# Patient Record
Sex: MALE | Race: WHITE | NOT HISPANIC OR LATINO | Employment: FULL TIME | URBAN - METROPOLITAN AREA
[De-identification: names, ages, dates, MRNs, and addresses within clinical notes are randomized per-mention and may not be internally consistent; named-entity substitution may affect disease eponyms.]

---

## 2022-03-10 ENCOUNTER — OFFICE VISIT (OUTPATIENT)
Dept: FAMILY MEDICINE | Facility: OTHER | Age: 52
End: 2022-03-10
Payer: COMMERCIAL

## 2022-03-10 VITALS
RESPIRATION RATE: 18 BRPM | TEMPERATURE: 97.8 F | OXYGEN SATURATION: 98 % | BODY MASS INDEX: 32.26 KG/M2 | SYSTOLIC BLOOD PRESSURE: 120 MMHG | HEIGHT: 71 IN | DIASTOLIC BLOOD PRESSURE: 80 MMHG | WEIGHT: 230.4 LBS | HEART RATE: 73 BPM

## 2022-03-10 DIAGNOSIS — J34.89 RHINORRHEA: Primary | ICD-10-CM

## 2022-03-10 DIAGNOSIS — H93.8X3 PRESSURE SENSATION IN BOTH EARS: ICD-10-CM

## 2022-03-10 DIAGNOSIS — R09.81 SINUS CONGESTION: ICD-10-CM

## 2022-03-10 DIAGNOSIS — Z71.6 ENCOUNTER FOR SMOKING CESSATION COUNSELING: ICD-10-CM

## 2022-03-10 LAB — SARS-COV-2 RNA RESP QL NAA+PROBE: NEGATIVE

## 2022-03-10 PROCEDURE — U0005 INFEC AGEN DETEC AMPLI PROBE: HCPCS | Performed by: PHYSICIAN ASSISTANT

## 2022-03-10 PROCEDURE — U0003 INFECTIOUS AGENT DETECTION BY NUCLEIC ACID (DNA OR RNA); SEVERE ACUTE RESPIRATORY SYNDROME CORONAVIRUS 2 (SARS-COV-2) (CORONAVIRUS DISEASE [COVID-19]), AMPLIFIED PROBE TECHNIQUE, MAKING USE OF HIGH THROUGHPUT TECHNOLOGIES AS DESCRIBED BY CMS-2020-01-R: HCPCS | Performed by: PHYSICIAN ASSISTANT

## 2022-03-10 PROCEDURE — 99204 OFFICE O/P NEW MOD 45 MIN: CPT | Performed by: PHYSICIAN ASSISTANT

## 2022-03-10 RX ORDER — AZITHROMYCIN 250 MG/1
TABLET, FILM COATED ORAL
Qty: 6 TABLET | Refills: 0 | Status: SHIPPED | OUTPATIENT
Start: 2022-03-10 | End: 2022-03-15

## 2022-03-10 NOTE — PROGRESS NOTES
Assessment & Plan     Rhinorrhea  Sinus congestion  Pressure sensation in both ears  Patient was swabbed for covid today and understands that he should remain at home until this returns. He will begin azithromycin today as well. If covid returns positive the patient will be advised to stop this medication and continue supportive cares only.   - Symptomatic; Unknown COVID-19 Virus (Coronavirus) by PCR Nose  - azithromycin (ZITHROMAX) 250 MG tablet; Take 2 tablets (500 mg) by mouth daily for 1 day, THEN 1 tablet (250 mg) daily for 4 days.    Encounter for smoking cessation counseling  Patient is motivated to quit smoking and has had success in the past with chantix. He would like to restart this medication. Reviewed strategies for smoking cessation with him. He will call if not tolerating the medication.   - varenicline (CHANTIX SEKOU) 0.5 MG X 11 & 1 MG X 42 tablet; Take 0.5 mg tab daily for 3 days, THEN 0.5 mg tab twice daily for 4 days, THEN 1 mg twice daily.    [facility]    Robin Morales is a 51 year old who presents for the following health issues     History of Present Illness       Reason for visit:  Sinus infection Not feeling 100%  Symptoms include:  Ears plugged runny nose  Symptom progression:  Staying the same  Had these symptoms before:  No  What makes it worse:  No  What makes it better:  No    He eats 2-3 servings of fruits and vegetables daily.He consumes 2 sweetened beverage(s) daily.He exercises with enough effort to increase his heart rate 20 to 29 minutes per day.  He exercises with enough effort to increase his heart rate 3 or less days per week.   He is taking medications regularly.       Patient is a 51 year old male who presents today with concerns about URI symptoms. He says that his symptoms began 1 week ago and have been getting worse. He reports feeling hot/cold, rhinorrhea, plugged ears, headaches and general fatigue. He has been  "relying on home therapies without benefit. He is agreeable to covid swab today. Works as a , but does not recall any specific exposure to anyone.     Review of Systems   Constitutional, HEENT, cardiovascular, pulmonary, gi and gu systems are negative, except as otherwise noted.      Objective    /80   Pulse 73   Temp 97.8  F (36.6  C) (Temporal)   Resp 18   Ht 1.8 m (5' 10.87\")   Wt 104.5 kg (230 lb 6.4 oz)   SpO2 98%   BMI 32.26 kg/m    Body mass index is 32.26 kg/m .  Physical Exam   GENERAL: healthy, alert and no distress  EYES: Eyes grossly normal to inspection, PERRL and conjunctivae and sclerae normal  HENT: normal cephalic/atraumatic, both ears: erythematous, nose and mouth without ulcers or lesions, nasal mucosa edematous , rhinorrhea clear, oropharynx clear and oral mucous membranes moist  RESP: lungs clear to auscultation - no rales, rhonchi or wheezes  CV: regular rate and rhythm, normal S1 S2, no S3 or S4, no murmur, click or rub, no peripheral edema and peripheral pulses strong  MS: no gross musculoskeletal defects noted, no edema  PSYCH: mentation appears normal and fatigued    No results found for this or any previous visit (from the past 24 hour(s)).            "

## 2022-03-10 NOTE — PATIENT INSTRUCTIONS
Patient Education     Understanding Sinus Problems     You don t often think about your sinuses until there s a problem. One day you realize you can t smell dinner cooking. Or you find you often have headaches or problems breathing through your nose.  Symptoms of sinus problems  Sinus problems can cause uncomfortable symptoms. Your nose may run nonstop. You might have trouble sleeping at night. You may even lose your sense of smell. Other symptoms are:    Nasal congestion    Fullness in ears    Green, yellow, or bloody drainage from the nose    Drainage in your throat    Bad breath    Trouble tasting food    Frequent headaches    Face pain    Cough  When sinuses are blocked  If something blocks the passages in the nose or sinuses, mucus can t drain. The sinuses may then become infected.    Colds cause the lining of the nose and sinuses to swell and make extra mucus. A buildup of mucus can lead to a more serious infection.    Allergies irritate turbinates and other tissues. This causes swelling, which can cause a blockage. Over time, this irritation can also lead to sacs of swollen tissue (polyps).    Polyps may form in both the sinuses and nose. Polyps can grow large enough to clog nasal passages and block drainage.    A crooked (deviated) septum may block nasal passages. This is often the result of an injury.  [employer] last reviewed this educational content on 1/1/2020 2000-2021 [employer]. All rights reserved. This information is not intended as a substitute for professional medical care. Always follow your healthcare professional's instructions.           Patient Education     Sinusitis (Antibiotic Treatment)    The sinuses are air-filled spaces within the bones of the face. They connect to the inside of the nose. Sinusitis is an inflammation of the tissue that lines the sinuses. Sinusitis can occur during a cold. It can also happen due to allergies to pollens and other particles in the air.  Sinusitis can cause symptoms of sinus congestion and a feeling of fullness. A sinus infection causes fever, headache, and facial pain. There is often green or yellow fluid draining from the nose or into the back of the throat (post-nasal drip). You have been given antibiotics to treat this condition.   Home care    Take the full course of antibiotics as instructed. Don't stop taking them, even when you feel better.    Drink plenty of water, hot tea, and other liquids as directed by the healthcare provider. This may help thin nasal mucus. It also may help your sinuses drain fluids.    Heat may help soothe painful areas of your face. Use a towel soaked in hot water. Or,  the shower and direct the warm spray onto your face. Using a vaporizer along with a menthol rub at night may also help soothe symptoms.     An expectorant with guaifenesin may help thin nasal mucus and help your sinuses drain fluids. Talk with your provider or pharmacists before taking an over-the-counter (OTC) medicine if you have any questions about it or its side effects..    You can use an OTC decongestant, unless a similar medicine was prescribed to you. Nasal sprays work the fastest. Use one that contains phenylephrine or oxymetazoline. First blow your nose gently. Then use the spray. Don't use these medicines more often than directed on the label. If you do, your symptoms may get worse. You may also take pills that contain pseudoephedrine. Don t use products that combine multiple medicines. This is because side effects may be increased. Read labels. You can also ask the pharmacist for help. (People with high blood pressure should not use decongestants. They can raise blood pressure.) Talk with your provider or pharmacist if you have any questions about the medicine..    OTC antihistamines may help if allergies contributed to your sinusitis. Talk with your provider or pharmacist if you have any questions about the medicine..    Don't use  nasal rinses or irrigation during an acute sinus infection, unless your healthcare provider tells you to. Rinsing may spread the infection to other areas in your sinuses.    Use acetaminophen or ibuprofen to control pain, unless another pain medicine was prescribed to you. If you have chronic liver or kidney disease or ever had a stomach ulcer, talk with your healthcare provider before using these medicines. Never give aspirin to anyone under age 18 who is ill with a fever. It may cause severe liver damage.    Don't smoke. This can make symptoms worse.    Follow-up care  Follow up with your healthcare provider, or as advised.   When to seek medical advice  Call your healthcare provider if any of these occur:     Facial pain or headache that gets worse    Stiff neck    Unusual drowsiness or confusion    Swelling of your forehead or eyelids    Symptoms don't go away in 10 days    Vision problems, such as blurred or double vision    Fever of 100.4 F (38 C) or higher, or as directed by your healthcare provider  Call 911  Call 911 if any of these occur:     Seizure    Trouble breathing    Feeling dizzy or faint    Fingernails, skin or lips look blue, purple , or gray  Prevention  Here are steps you can take to help prevent an infection:     Keep good hand washing habits.    Don t have close contact with people who have sore throats, colds, or other upper respiratory infections.    Don t smoke, and stay away from secondhand smoke.    Stay up to date with of your vaccines.  [employer] last reviewed this educational content on 12/1/2019 2000-2021 [employer]. All rights reserved. This information is not intended as a substitute for professional medical care. Always follow your healthcare professional's instructions.

## 2022-03-11 ENCOUNTER — TELEPHONE (OUTPATIENT)
Dept: FAMILY MEDICINE | Facility: CLINIC | Age: 52
End: 2022-03-11
Payer: COMMERCIAL

## 2022-03-11 DIAGNOSIS — Z71.6 ENCOUNTER FOR SMOKING CESSATION COUNSELING: Primary | ICD-10-CM

## 2022-03-11 RX ORDER — VARENICLINE TARTRATE 1 MG/1
1 TABLET, FILM COATED ORAL 2 TIMES DAILY
Qty: 60 TABLET | Refills: 1 | Status: SHIPPED | OUTPATIENT
Start: 2022-03-11

## 2022-03-11 NOTE — TELEPHONE ENCOUNTER
varenicline (CHANTIX SEKOU) 0.5 MG X 11 & 1 MG X 42 tablet     - the starter pack is off the market ,please send separate Rx for 1 Mg tabs

## 2022-10-15 ENCOUNTER — HEALTH MAINTENANCE LETTER (OUTPATIENT)
Age: 52
End: 2022-10-15

## 2023-10-29 ENCOUNTER — HEALTH MAINTENANCE LETTER (OUTPATIENT)
Age: 53
End: 2023-10-29

## 2023-12-13 ENCOUNTER — TRANSFERRED RECORDS (OUTPATIENT)
Dept: HEALTH INFORMATION MANAGEMENT | Facility: CLINIC | Age: 53
End: 2023-12-13
Payer: COMMERCIAL

## 2024-12-21 ENCOUNTER — HEALTH MAINTENANCE LETTER (OUTPATIENT)
Age: 54
End: 2024-12-21

## 2025-02-17 ENCOUNTER — OFFICE VISIT (OUTPATIENT)
Dept: OTOLARYNGOLOGY | Facility: CLINIC | Age: 55
End: 2025-02-17
Payer: COMMERCIAL

## 2025-02-17 VITALS
SYSTOLIC BLOOD PRESSURE: 130 MMHG | HEART RATE: 106 BPM | DIASTOLIC BLOOD PRESSURE: 78 MMHG | TEMPERATURE: 98.3 F | HEIGHT: 72 IN | BODY MASS INDEX: 30.96 KG/M2 | WEIGHT: 228.56 LBS

## 2025-02-17 DIAGNOSIS — H61.21 IMPACTED CERUMEN OF RIGHT EAR: ICD-10-CM

## 2025-02-17 DIAGNOSIS — K13.79 LESION OF SOFT PALATE: Primary | ICD-10-CM

## 2025-02-17 DIAGNOSIS — J34.2 DEVIATED NASAL SEPTUM: ICD-10-CM

## 2025-02-17 DIAGNOSIS — J31.0 CHRONIC RHINITIS: ICD-10-CM

## 2025-02-17 PROCEDURE — 99203 OFFICE O/P NEW LOW 30 MIN: CPT | Mod: 25 | Performed by: OTOLARYNGOLOGY

## 2025-02-17 PROCEDURE — 88305 TISSUE EXAM BY PATHOLOGIST: CPT | Performed by: PATHOLOGY

## 2025-02-17 PROCEDURE — 40808 BIOPSY OF MOUTH LESION: CPT | Performed by: OTOLARYNGOLOGY

## 2025-02-17 PROCEDURE — 69210 REMOVE IMPACTED EAR WAX UNI: CPT | Mod: RT | Performed by: OTOLARYNGOLOGY

## 2025-02-17 RX ORDER — PANTOPRAZOLE SODIUM 40 MG/1
40 TABLET, DELAYED RELEASE ORAL DAILY
COMMUNITY

## 2025-02-17 RX ORDER — RIVAROXABAN 20 MG/1
20 TABLET, FILM COATED ORAL
COMMUNITY

## 2025-02-17 RX ORDER — MOMETASONE FUROATE MONOHYDRATE 50 UG/1
2 SPRAY, METERED NASAL DAILY
Qty: 17 G | Refills: 0 | Status: SHIPPED | OUTPATIENT
Start: 2025-02-17 | End: 2025-03-10

## 2025-02-17 ASSESSMENT — PAIN SCALES - GENERAL: PAINLEVEL_OUTOF10: MODERATE PAIN (5)

## 2025-02-17 NOTE — LETTER
2/17/2025      Andrew Betts  [id number] [address] [facility] [id number]      Dear Colleague,    Thank you for referring your patient, Andrew Betts, to the [facility]. Please see a copy of my visit note below.    ENT Consultation    Andrew Betts who is a 54 year old male seen in consultation at the request of self.      History of Present Illness - Andrew Betts is a 54 year old male for evaluation of lesion followed by his dentist in his mouth recently.  Patient is a smoker.  Patient also complains of more nasal congestion lately secretions mostly clear white stringy sometimes little drainage.  Ears feel plugged.  Denies any history of allergies.  Denies any any significant history of sinus infections.  Gets a little bit of pressure in the area of the forehead centrally.  Right ear feels more plugged than the left.        BP Readings from Last 1 Encounters:   02/17/25 130/78       BP noted to be well controlled today in office.     Andrew IS a smoker/never uses chewing tobacco.  Andrew is ready to quit      Past Medical History - No past medical history on file.    Current Medications -   Current Outpatient Medications:      pantoprazole (PROTONIX) 40 MG EC tablet, Take 40 mg by mouth daily., Disp: , Rfl:      varenicline (CHANTIX SEKOU) 0.5 MG X 11 & 1 MG X 42 tablet, Take 0.5 mg tab daily for 3 days, THEN 0.5 mg tab twice daily for 4 days, THEN 1 mg twice daily. (Patient not taking: Reported on 2/17/2025), Disp: 53 tablet, Rfl: 0     varenicline (CHANTIX) 1 MG tablet, Take 1 tablet (1 mg) by mouth 2 times daily (Patient not taking: Reported on 2/17/2025), Disp: 60 tablet, Rfl: 1     XARELTO ANTICOAGULANT 20 MG TABS tablet, Take 20 mg by mouth., Disp: , Rfl:     Allergies -   Allergies   Allergen Reactions     Flour        Social History -   Social History     Socioeconomic History     Marital status: Single   Tobacco Use     Smoking status: Every Day     Smokeless  tobacco: Never   Vaping Use     Vaping status: Never Used   Substance and Sexual Activity     Alcohol use: Yes     Comment: once every 3 months     Drug use: Yes     Types: Marijuana     Social Drivers of Health     Financial Resource Strain: Low Risk  (11/1/2024)    Received from [facility] [employer] [facility] [employer] [facility]    Financial Resource Strain      Difficulty of Paying Living Expenses: 3   Food Insecurity: No Food Insecurity (11/1/2024)    Received from [facility] [employer] [facility]    Food Insecurity      Do you worry your food will run out before you are able to buy more?: 1   Transportation Needs: No Transportation Needs (11/1/2024)    Received from [facility] [employer] [facility]    Transportation Needs      Does lack of transportation keep you from medical appointments?: 1      Does lack of transportation keep you from work, meetings or getting things that you need?: 1   Social Connections: Socially Integrated (11/1/2024)    Received from [facility] [employer] [facility]    Social Connections      Do you often feel lonely or isolated from those around you?: 0   Housing Stability: Low Risk  (11/1/2024)    Received from [facility] [employer] [facility]    Housing Stability      What is your housing situation today?: 1       Family History - History reviewed. No pertinent family history.    Review of Systems - As per HPI and PMHx, otherwise review of system review of the head and neck negative. Otherwise 10+ review of system is negative    Physical Exam  /78 (BP Location: Right arm, Patient Position: Sitting, Cuff Size: Adult Large)   Pulse 106   Temp 98.3  F (36.8  C) (Temporal)   Ht 1.829 m (6')   Wt 103.7 kg (228 lb 9 oz)   BMI 31.00 kg/m    BMI: Body mass index is 31 kg/m .    General - The patient is well nourished and well developed, and appears to have good nutritional status.  Alert and oriented to person and place, answers questions  and cooperates with examination appropriately.    SKIN - No suspicious lesions or rashes.  Respiration - No respiratory distress.  Head and Face - Normocephalic and atraumatic, with no gross asymmetry noted of the contour of the facial features.  The facial nerve is intact, with strong symmetric movements.    Voice and Breathing - The patient was breathing comfortably without the use of accessory muscles. The patients voice was clear and strong, and had appropriate pitch and quality.    Ears - Bilateral pinna and EACs with normal appearing overlying skin. Tympanic membrane intact with good mobility on pneumatic otoscopy bilaterally. Bony landmarks of the ossicular chain are normal. The tympanic membranes are normal in appearance. No retraction, perforation, or masses.  No fluid or purulence was seen in the external canal or the middle ear.  Exam was after cerumen disimpaction on the right side.    Eyes - Extraocular movements intact.  Sclera were not icteric or injected, conjunctiva were pink and moist.    Mouth - Examination of the oral cavity showed pink, healthy oral mucosa.  About 1.5 mm raised lesion was noted in the middle of the soft palate to the right of the midline noted.  The tongue was mobile and midline, and the dentition were in good condition.      Throat - The walls of the oropharynx were smooth, pink, moist, symmetric, and had no lesions or ulcerations.  The tonsillar pillars and soft palate were symmetric.  The uvula was midline on elevation.    Neck - Normal midline excursion of the laryngotracheal complex during swallowing.  Full range of motion on passive movement.  Palpation of the occipital, submental, submandibular, internal jugular chain, and supraclavicular nodes did not demonstrate any abnormal lymph nodes or masses.  The carotid pulse was palpable bilaterally.  Palpation of the thyroid was soft and smooth, with no nodules or goiter appreciated.  The trachea was mobile and midline.    Nose  - External contour is symmetric, no gross deflection or scars.  Nasal mucosa is erythematous and moist with no abnormal mucus.  The septum was deviated to the right and somewhat obstructive, turbinates of normal size and position.  No polyps, masses, or purulence noted on examination.  Middle turbinates also appear to be somewhat erythematous bilaterally somewhat dry    Neuro - Nonfocal neuro exam is normal, CN 2 through 12 intact, normal gait and muscle tone.      Performed in clinic today:  Cerumen disimpaction right ear.  That the guidance of magnified speculum using cerumen curette I carefully disimpacted cerumen impaction in the proximal canal on the right.  Tympanic membrane was clear.  Patient stated that subjectively he could hear better.    Biopsy soft palate lesion.  Patient understands risks and benefits of the biopsy of the lesion considering he is a smoker certainly is concerned.  The risks of infection and bleeding were discussed.  Use topical anesthetic with Cetacaine followed by #11 blade to raise the edge of the lesion.  With a cup forceps the lesions picked up and removed.  Hemostasis controlled with silver nitrate.  Lesion submitted to pathology.  Patient tolerated procedure well.      A/P - Andrew Betts is a 54 year old male who had cerumen disimpaction on the right with some improvement in symptoms but still has little bit of eustachian tube dysfunction symptomatology likely due to nasal symptoms.  Has some degree of rhinitis right now as well as a structurally deviated septum causing some of the issues.  For the rhinitis we will start him on Nasonex spray to use daily after he rinses his nose with saline in the mornings.  Will await the results of the biopsy and contact the patient in regard to the results.  Patient will follow-up as needed depending on the results of the biopsy.  For discomfort either Tylenol and ibuprofen would be acceptable postbiopsy.  More of a bland diet for the next  few days is advised.      Jerry Burch MD       Again, thank you for allowing me to participate in the care of your patient.        Sincerely,        Jerry Burch MD, MD    Electronically signed

## 2025-02-17 NOTE — PROGRESS NOTES
ENT Consultation    Andrew Betts who is a 54 year old male seen in consultation at the request of self.      History of Present Illness - Andrew Betts is a 54 year old male for evaluation of lesion followed by his dentist in his mouth recently.  Patient is a smoker.  Patient also complains of more nasal congestion lately secretions mostly clear white stringy sometimes little drainage.  Ears feel plugged.  Denies any history of allergies.  Denies any any significant history of sinus infections.  Gets a little bit of pressure in the area of the forehead centrally.  Right ear feels more plugged than the left.        BP Readings from Last 1 Encounters:   02/17/25 130/78       BP noted to be well controlled today in office.     Andrew IS a smoker/never uses chewing tobacco.  Andrew is ready to quit      Past Medical History - No past medical history on file.    Current Medications -   Current Outpatient Medications:     pantoprazole (PROTONIX) 40 MG EC tablet, Take 40 mg by mouth daily., Disp: , Rfl:     varenicline (CHANTIX SEKOU) 0.5 MG X 11 & 1 MG X 42 tablet, Take 0.5 mg tab daily for 3 days, THEN 0.5 mg tab twice daily for 4 days, THEN 1 mg twice daily. (Patient not taking: Reported on 2/17/2025), Disp: 53 tablet, Rfl: 0    varenicline (CHANTIX) 1 MG tablet, Take 1 tablet (1 mg) by mouth 2 times daily (Patient not taking: Reported on 2/17/2025), Disp: 60 tablet, Rfl: 1    XARELTO ANTICOAGULANT 20 MG TABS tablet, Take 20 mg by mouth., Disp: , Rfl:     Allergies -   Allergies   Allergen Reactions    Flour        Social History -   Social History     Socioeconomic History    Marital status: Single   Tobacco Use    Smoking status: Every Day    Smokeless tobacco: Never   Vaping Use    Vaping status: Never Used   Substance and Sexual Activity    Alcohol use: Yes     Comment: once every 3 months    Drug use: Yes     Types: Marijuana     Social Drivers of Health     Financial Resource Strain: Low Risk  (11/1/2024)     Received from [facility]    Financial Resource Strain     Difficulty of Paying Living Expenses: 3   Food Insecurity: No Food Insecurity (11/1/2024)    Received from [facility]    Food Insecurity     Do you worry your food will run out before you are able to buy more?: 1   Transportation Needs: No Transportation Needs (11/1/2024)    Received from [facility]    Transportation Needs     Does lack of transportation keep you from medical appointments?: 1     Does lack of transportation keep you from work, meetings or getting things that you need?: 1   Social Connections: Socially Integrated (11/1/2024)    Received from [facility]    Social Connections     Do you often feel lonely or isolated from those around you?: 0   Housing Stability: Low Risk  (11/1/2024)    Received from [facility]    Housing Stability     What is your housing situation today?: 1       Family History - History reviewed. No pertinent family history.    Review of Systems - As per HPI and PMHx, otherwise review of system review of the head and neck negative. Otherwise 10+ review of system is negative    Physical Exam  /78 (BP Location: Right arm, Patient Position: Sitting, Cuff Size: Adult Large)   Pulse 106   Temp 98.3  F (36.8  C) (Temporal)   Ht 1.829 m (6')   Wt 103.7 kg (228 lb 9 oz)   BMI 31.00 kg/m    BMI: Body mass index is 31 kg/m .    General - The patient is well nourished and well developed, and appears to have good nutritional status.  Alert and oriented to person and place, answers questions and cooperates with examination appropriately.    SKIN - No suspicious lesions or rashes.  Respiration - No respiratory distress.  Head and Face - Normocephalic and atraumatic, with no gross asymmetry noted of the contour of the facial features.  The facial nerve is  intact, with strong symmetric movements.    Voice and Breathing - The patient was breathing comfortably without the use of accessory muscles. The patients voice was clear and strong, and had appropriate pitch and quality.    Ears - Bilateral pinna and EACs with normal appearing overlying skin. Tympanic membrane intact with good mobility on pneumatic otoscopy bilaterally. Bony landmarks of the ossicular chain are normal. The tympanic membranes are normal in appearance. No retraction, perforation, or masses.  No fluid or purulence was seen in the external canal or the middle ear.  Exam was after cerumen disimpaction on the right side.    Eyes - Extraocular movements intact.  Sclera were not icteric or injected, conjunctiva were pink and moist.    Mouth - Examination of the oral cavity showed pink, healthy oral mucosa.  About 1.5 mm raised lesion was noted in the middle of the soft palate to the right of the midline noted.  The tongue was mobile and midline, and the dentition were in good condition.      Throat - The walls of the oropharynx were smooth, pink, moist, symmetric, and had no lesions or ulcerations.  The tonsillar pillars and soft palate were symmetric.  The uvula was midline on elevation.    Neck - Normal midline excursion of the laryngotracheal complex during swallowing.  Full range of motion on passive movement.  Palpation of the occipital, submental, submandibular, internal jugular chain, and supraclavicular nodes did not demonstrate any abnormal lymph nodes or masses.  The carotid pulse was palpable bilaterally.  Palpation of the thyroid was soft and smooth, with no nodules or goiter appreciated.  The trachea was mobile and midline.    Nose - External contour is symmetric, no gross deflection or scars.  Nasal mucosa is erythematous and moist with no abnormal mucus.  The septum was deviated to the right and somewhat obstructive, turbinates of normal size and position.  No polyps, masses, or purulence  noted on examination.  Middle turbinates also appear to be somewhat erythematous bilaterally somewhat dry    Neuro - Nonfocal neuro exam is normal, CN 2 through 12 intact, normal gait and muscle tone.      Performed in clinic today:  Cerumen disimpaction right ear.  That the guidance of magnified speculum using cerumen curette I carefully disimpacted cerumen impaction in the proximal canal on the right.  Tympanic membrane was clear.  Patient stated that subjectively he could hear better.    Biopsy soft palate lesion.  Patient understands risks and benefits of the biopsy of the lesion considering he is a smoker certainly is concerned.  The risks of infection and bleeding were discussed.  Use topical anesthetic with Cetacaine followed by #11 blade to raise the edge of the lesion.  With a cup forceps the lesions picked up and removed.  Hemostasis controlled with silver nitrate.  Lesion submitted to pathology.  Patient tolerated procedure well.      A/P - Andrew Betts is a 54 year old male who had cerumen disimpaction on the right with some improvement in symptoms but still has little bit of eustachian tube dysfunction symptomatology likely due to nasal symptoms.  Has some degree of rhinitis right now as well as a structurally deviated septum causing some of the issues.  For the rhinitis we will start him on Nasonex spray to use daily after he rinses his nose with saline in the mornings.  Will await the results of the biopsy and contact the patient in regard to the results.  Patient will follow-up as needed depending on the results of the biopsy.  For discomfort either Tylenol and ibuprofen would be acceptable postbiopsy.  More of a bland diet for the next few days is advised.      Jerry Burch MD

## 2025-02-19 LAB
PATH REPORT.COMMENTS IMP SPEC: NORMAL
PATH REPORT.COMMENTS IMP SPEC: NORMAL
PATH REPORT.FINAL DX SPEC: NORMAL
PATH REPORT.GROSS SPEC: NORMAL
PATH REPORT.MICROSCOPIC SPEC OTHER STN: NORMAL
PATH REPORT.RELEVANT HX SPEC: NORMAL
PHOTO IMAGE: NORMAL

## 2025-07-28 ENCOUNTER — TELEPHONE (OUTPATIENT)
Dept: OTOLARYNGOLOGY | Facility: CLINIC | Age: 55
End: 2025-07-28
Payer: COMMERCIAL

## 2025-08-20 ENCOUNTER — TRANSFERRED RECORDS (OUTPATIENT)
Dept: HEALTH INFORMATION MANAGEMENT | Facility: CLINIC | Age: 55
End: 2025-08-20
Payer: COMMERCIAL